# Patient Record
Sex: MALE | Race: WHITE | Employment: FULL TIME | URBAN - METROPOLITAN AREA
[De-identification: names, ages, dates, MRNs, and addresses within clinical notes are randomized per-mention and may not be internally consistent; named-entity substitution may affect disease eponyms.]

---

## 2020-01-21 PROBLEM — Z13.228 SCREENING FOR METABOLIC DISORDER: Status: ACTIVE | Noted: 2020-01-21

## 2020-01-21 PROBLEM — R59.1 LYMPHADENOPATHY OF HEAD AND NECK: Status: ACTIVE | Noted: 2020-01-21

## 2020-01-21 PROBLEM — E66.3 OVERWEIGHT: Status: ACTIVE | Noted: 2020-01-21

## 2020-01-21 PROBLEM — H61.892: Status: ACTIVE | Noted: 2020-01-21

## 2020-01-21 PROBLEM — Z71.3 DIETARY COUNSELING: Status: ACTIVE | Noted: 2020-01-21

## 2020-01-28 ENCOUNTER — HOSPITAL ENCOUNTER (OUTPATIENT)
Dept: ULTRASOUND IMAGING | Age: 30
Discharge: HOME | End: 2020-01-28
Attending: FAMILY MEDICINE
Payer: COMMERCIAL

## 2020-01-28 ENCOUNTER — HOSPITAL ENCOUNTER (OUTPATIENT)
Dept: GENERAL RADIOLOGY | Age: 30
Discharge: HOME | End: 2020-01-28
Attending: FAMILY MEDICINE
Payer: COMMERCIAL

## 2020-01-28 DIAGNOSIS — R59.1 LYMPHADENOPATHY OF HEAD AND NECK: ICD-10-CM

## 2020-01-28 DIAGNOSIS — H61.892 SWELLING OF EXTERNAL EAR, LEFT: ICD-10-CM

## 2020-01-28 DIAGNOSIS — Z13.228 SCREENING FOR METABOLIC DISORDER: ICD-10-CM

## 2020-01-28 PROCEDURE — 76536 US EXAM OF HEAD AND NECK: CPT

## 2020-01-28 PROCEDURE — 71046 X-RAY EXAM CHEST 2 VIEWS: CPT

## 2020-01-29 PROBLEM — R22.1 MASS OF LATERAL NECK: Status: ACTIVE | Noted: 2020-01-29

## 2020-02-07 ENCOUNTER — HOSPITAL ENCOUNTER (OUTPATIENT)
Dept: CT IMAGING | Age: 30
Discharge: HOME | End: 2020-02-07
Attending: FAMILY MEDICINE
Payer: COMMERCIAL

## 2020-02-07 DIAGNOSIS — R22.1 MASS OF LATERAL NECK: ICD-10-CM

## 2020-02-07 DIAGNOSIS — H61.892 SWELLING OF EXTERNAL EAR, LEFT: ICD-10-CM

## 2020-02-07 DIAGNOSIS — R59.1 LYMPHADENOPATHY OF HEAD AND NECK: ICD-10-CM

## 2020-02-07 PROCEDURE — 74011000258 HC RX REV CODE- 258: Performed by: FAMILY MEDICINE

## 2020-02-07 PROCEDURE — 70491 CT SOFT TISSUE NECK W/DYE: CPT

## 2020-02-07 PROCEDURE — 74011636320 HC RX REV CODE- 636/320: Performed by: FAMILY MEDICINE

## 2020-02-07 RX ORDER — SODIUM CHLORIDE 0.9 % (FLUSH) 0.9 %
10 SYRINGE (ML) INJECTION
Status: COMPLETED | OUTPATIENT
Start: 2020-02-07 | End: 2020-02-07

## 2020-02-07 RX ADMIN — Medication 10 ML: at 07:45

## 2020-02-07 RX ADMIN — SODIUM CHLORIDE 100 ML: 900 INJECTION, SOLUTION INTRAVENOUS at 07:45

## 2020-02-07 RX ADMIN — IOPAMIDOL 80 ML: 755 INJECTION, SOLUTION INTRAVENOUS at 07:45

## 2020-02-09 NOTE — PROGRESS NOTES
CT neck shows --possible tumor--pt should f/u with dermatologist for BIOPSY to know the definite kind